# Patient Record
Sex: MALE | Race: ASIAN | NOT HISPANIC OR LATINO | Employment: OTHER | URBAN - METROPOLITAN AREA
[De-identification: names, ages, dates, MRNs, and addresses within clinical notes are randomized per-mention and may not be internally consistent; named-entity substitution may affect disease eponyms.]

---

## 2017-07-24 ENCOUNTER — TRANSCRIBE ORDERS (OUTPATIENT)
Dept: ADMINISTRATIVE | Facility: HOSPITAL | Age: 32
End: 2017-07-24

## 2017-07-24 DIAGNOSIS — R05.9 COUGH: ICD-10-CM

## 2017-07-24 DIAGNOSIS — K21.9 GASTROESOPHAGEAL REFLUX DISEASE, ESOPHAGITIS PRESENCE NOT SPECIFIED: Primary | ICD-10-CM

## 2017-07-24 DIAGNOSIS — J32.9 UNSPECIFIED SINUSITIS (CHRONIC): ICD-10-CM

## 2017-08-04 ENCOUNTER — HOSPITAL ENCOUNTER (OUTPATIENT)
Dept: PULMONOLOGY | Facility: HOSPITAL | Age: 32
Discharge: HOME | End: 2017-08-04
Payer: COMMERCIAL

## 2017-08-11 ENCOUNTER — HOSPITAL ENCOUNTER (OUTPATIENT)
Dept: PULMONOLOGY | Facility: HOSPITAL | Age: 32
Discharge: HOME | End: 2017-08-11
Payer: COMMERCIAL

## 2017-08-11 ENCOUNTER — GENERIC CONVERSION - ENCOUNTER (OUTPATIENT)
Dept: OTHER | Facility: OTHER | Age: 32
End: 2017-08-11

## 2017-08-11 DIAGNOSIS — K21.9 GASTROESOPHAGEAL REFLUX DISEASE, ESOPHAGITIS PRESENCE NOT SPECIFIED: ICD-10-CM

## 2017-08-11 DIAGNOSIS — R05.9 COUGH: ICD-10-CM

## 2017-08-11 DIAGNOSIS — J32.9 UNSPECIFIED SINUSITIS (CHRONIC): ICD-10-CM

## 2017-08-11 PROCEDURE — 94070 EVALUATION OF WHEEZING: CPT

## 2017-08-11 PROCEDURE — 94760 N-INVAS EAR/PLS OXIMETRY 1: CPT

## 2017-08-11 RX ORDER — ALBUTEROL SULFATE 2.5 MG/3ML
2.5 SOLUTION RESPIRATORY (INHALATION) ONCE AS NEEDED
Status: COMPLETED | OUTPATIENT
Start: 2017-08-11 | End: 2017-08-11

## 2017-08-11 RX ADMIN — ALBUTEROL SULFATE 2.5 MG: 2.5 SOLUTION RESPIRATORY (INHALATION) at 10:36

## 2017-08-11 RX ADMIN — METHACHOLINE CHLORIDE 5 BREATH: 100 POWDER, FOR SOLUTION RESPIRATORY (INHALATION) at 10:23

## 2018-06-05 RX ORDER — MONTELUKAST SODIUM 10 MG/1
TABLET ORAL
Refills: 5 | COMMUNITY
Start: 2018-04-11

## 2018-06-06 ENCOUNTER — OFFICE VISIT (OUTPATIENT)
Dept: INTERNAL MEDICINE CLINIC | Facility: CLINIC | Age: 33
End: 2018-06-06
Payer: COMMERCIAL

## 2018-06-06 VITALS
WEIGHT: 174 LBS | BODY MASS INDEX: 25.77 KG/M2 | DIASTOLIC BLOOD PRESSURE: 70 MMHG | HEART RATE: 70 BPM | RESPIRATION RATE: 14 BRPM | SYSTOLIC BLOOD PRESSURE: 100 MMHG | HEIGHT: 69 IN

## 2018-06-06 DIAGNOSIS — M79.89 MASS OF SOFT TISSUE OF RIGHT UPPER EXTREMITY: Primary | ICD-10-CM

## 2018-06-06 PROCEDURE — 1036F TOBACCO NON-USER: CPT | Performed by: INTERNAL MEDICINE

## 2018-06-06 PROCEDURE — 99203 OFFICE O/P NEW LOW 30 MIN: CPT | Performed by: INTERNAL MEDICINE

## 2018-06-06 NOTE — PATIENT INSTRUCTIONS
Problem List Items Addressed This Visit     Mass of soft tissue of right upper extremity - Primary      Most likely small lipoma, could also be small lymph node, does not have characteristics of a malignancy  Will get ultrasound for further characterization and consider surgical evaluation if needed           Relevant Orders    US extremity soft tissue

## 2018-06-06 NOTE — PROGRESS NOTES
Assessment/Plan:    Mass of soft tissue of right upper extremity   Most likely small lipoma, could also be small lymph node, does not have characteristics of a malignancy  Will get ultrasound for further characterization and consider surgical evaluation if needed  Diagnoses and all orders for this visit:    Mass of soft tissue of right upper extremity  -     US extremity soft tissue; Future          Subjective:      Patient ID: Lu Mcdaniel is a 27 y o  male  Lump in left upper arm for about 6 months, no significant change over that time, no significant pain, but is little sore when he presses on it sometimes  Chronic cough: pt on montelukast for this which helps some        The following portions of the patient's history were reviewed and updated as appropriate: allergies, current medications, past family history, past medical history, past social history, past surgical history and problem list     Review of Systems   Constitutional: Negative for chills, fatigue and fever  HENT: Negative for congestion, nosebleeds, postnasal drip, sore throat and trouble swallowing  Eyes: Negative for pain  Respiratory: Positive for cough  Negative for chest tightness, shortness of breath and wheezing  Cardiovascular: Negative for chest pain, palpitations and leg swelling  Gastrointestinal: Negative for abdominal pain, constipation, diarrhea, nausea and vomiting  Endocrine: Negative for polydipsia and polyuria  Genitourinary: Negative for dysuria, flank pain and hematuria  Musculoskeletal: Negative for arthralgias  Skin: Negative for rash  Neurological: Negative for dizziness, tremors and headaches  Hematological: Does not bruise/bleed easily  Psychiatric/Behavioral: Negative for confusion and dysphoric mood  The patient is not nervous/anxious            Objective:      /70   Pulse 70   Resp 14   Ht 5' 8 5" (1 74 m)   Wt 78 9 kg (174 lb)   BMI 26 07 kg/m²          Physical Exam Constitutional: He is oriented to person, place, and time  He appears well-developed and well-nourished  No distress  HENT:   Head: Normocephalic and atraumatic  Right Ear: External ear normal    Left Ear: External ear normal    Eyes: Conjunctivae are normal  No scleral icterus  Neck: Normal range of motion  Neck supple  No tracheal deviation present  No thyromegaly present  Cardiovascular: Normal rate, regular rhythm and normal heart sounds  Pulmonary/Chest: Effort normal and breath sounds normal  No respiratory distress  He has no wheezes  He has no rales  Abdominal: Soft  Bowel sounds are normal  There is no tenderness  There is no rebound and no guarding  Musculoskeletal: He exhibits no edema  Small oval soft tissue mass, not fixed, not nodular, not firm   Lymphadenopathy:     He has no cervical adenopathy  Neurological: He is alert and oriented to person, place, and time  Psychiatric: He has a normal mood and affect  His behavior is normal  Judgment and thought content normal    Vitals reviewed

## 2018-06-06 NOTE — ASSESSMENT & PLAN NOTE
Most likely small lipoma, could also be small lymph node, does not have characteristics of a malignancy  Will get ultrasound for further characterization and consider surgical evaluation if needed

## 2018-08-10 ENCOUNTER — OFFICE VISIT (OUTPATIENT)
Dept: INTERNAL MEDICINE CLINIC | Facility: CLINIC | Age: 33
End: 2018-08-10
Payer: COMMERCIAL

## 2018-08-10 VITALS
DIASTOLIC BLOOD PRESSURE: 80 MMHG | HEART RATE: 79 BPM | HEIGHT: 69 IN | TEMPERATURE: 98.2 F | OXYGEN SATURATION: 98 % | WEIGHT: 170.2 LBS | SYSTOLIC BLOOD PRESSURE: 110 MMHG | BODY MASS INDEX: 25.21 KG/M2

## 2018-08-10 DIAGNOSIS — R21 RASH: Primary | ICD-10-CM

## 2018-08-10 PROCEDURE — 99213 OFFICE O/P EST LOW 20 MIN: CPT | Performed by: INTERNAL MEDICINE

## 2018-08-10 PROCEDURE — 3008F BODY MASS INDEX DOCD: CPT | Performed by: INTERNAL MEDICINE

## 2018-08-10 RX ORDER — TRIAMCINOLONE ACETONIDE 5 MG/G
CREAM TOPICAL 3 TIMES DAILY
COMMUNITY
Start: 2017-10-25 | End: 2018-10-25

## 2018-08-10 RX ORDER — CETIRIZINE HYDROCHLORIDE 10 MG/1
10 TABLET ORAL
COMMUNITY
End: 2018-08-10 | Stop reason: SDUPTHER

## 2018-08-10 RX ORDER — MOMETASONE FUROATE 50 UG/1
2 SPRAY, METERED NASAL
COMMUNITY

## 2018-08-10 RX ORDER — NYSTATIN 100000 U/G
CREAM TOPICAL 2 TIMES DAILY
Qty: 30 G | Refills: 0 | Status: SHIPPED | OUTPATIENT
Start: 2018-08-10

## 2018-08-10 RX ORDER — METHYLPREDNISOLONE 4 MG/1
TABLET ORAL
Qty: 21 TABLET | Refills: 0 | Status: SHIPPED | OUTPATIENT
Start: 2018-08-10 | End: 2020-08-25 | Stop reason: ALTCHOICE

## 2018-08-10 NOTE — PROGRESS NOTES
Assessment/Plan:    Rash   Patient's rash on neck and back of leg have the classic appearance of poison ivy, this should improve with time and topical treatment with calamine lotion, patient instructed that if erythema worsening, we could try an antibiotic in case an early cellulitis is developing  Rash on forehead could be an irritant dermatitis from combination of sun and sweat and wiping, it does not have the appearance of poison ivy  The initial rash from a couple weeks ago is more difficult,  This does not have the classic distribution or appearance of scabies  Pt is being referred to Derm,  also consider allergy testing       Diagnoses and all orders for this visit:    Rash  -     Ambulatory referral to Dermatology; Future  -     nystatin (MYCOSTATIN) cream; Apply topically 2 (two) times a day  -     Methylprednisolone 4 MG TBPK; Use as directed on package    Other orders  -     Discontinue: cetirizine (ZyrTEC) 10 mg tablet; Take 10 mg by mouth  -     mometasone (NASONEX) 50 mcg/act nasal spray; 2 sprays into each nostril  -     triamcinolone (KENALOG) 0 5 % cream; Apply topically Three times a day          Subjective:      Patient ID: Talib Louis is a 27 y o  male  Onset of rash 2-3 weeks ago had itchy rash located on abdomen and arm  Since it looked similar to what he had a year ago, he used permethrin for when he had possible scabies a year ago  The topical treatment seemed to help a little  Then patient develops another rash in his right neck area and behind his right knee which has been very itchy, and has more classic appearance of contact dermatitis from poison ivy with vesicles and oozing  He also developed a rash in his forehead after being out in the sun  Rash is located both sides of far head, no vesicles or crusting, no pain  He was sweating and wiping area a lot          The following portions of the patient's history were reviewed and updated as appropriate: allergies, current medications, past family history, past medical history, past social history, past surgical history and problem list     Review of Systems      Objective:      /80   Pulse 79   Temp 98 2 °F (36 8 °C)   Ht 5' 9" (1 753 m)   Wt 77 2 kg (170 lb 3 2 oz)   SpO2 98%   BMI 25 13 kg/m²          Physical Exam   Constitutional: He appears well-developed and well-nourished  HENT:   No tongue or lip swelling   Pulmonary/Chest: No stridor  He has no wheezes  Skin:   Patient has macular her vesicular patch on right neck with some linear distribution, erythema and losing behind the right knee, and some scattered papular lesions on arm leg    On trunk in some folds has some erythema with satellite lesions

## 2018-08-10 NOTE — PATIENT INSTRUCTIONS
Problem List Items Addressed This Visit     Rash - Primary      Patient's rash on neck and back of leg have the classic appearance of poison ivy, this should improve with time and topical treatment with calamine lotion, patient instructed that if erythema worsening, we could try an antibiotic in case an early cellulitis is developing  Rash on forehead could be an irritant dermatitis from combination of sun and sweat and wiping, it does not have the appearance of poison ivy  The initial rash from a couple weeks ago is more difficult,  This does not have the classic distribution or appearance of scabies   Pt is being referred to Derm,  also consider allergy testing         Relevant Medications    triamcinolone (KENALOG) 0 5 % cream    nystatin (MYCOSTATIN) cream    Methylprednisolone 4 MG TBPK    Other Relevant Orders    Ambulatory referral to Dermatology

## 2018-08-10 NOTE — ASSESSMENT & PLAN NOTE
Patient's rash on neck and back of leg have the classic appearance of poison ivy, this should improve with time and topical treatment with calamine lotion, patient instructed that if erythema worsening, we could try an antibiotic in case an early cellulitis is developing  Rash on forehead could be an irritant dermatitis from combination of sun and sweat and wiping, it does not have the appearance of poison ivy  The initial rash from a couple weeks ago is more difficult,  This does not have the classic distribution or appearance of scabies   Pt is being referred to [facility],  also consider allergy testing

## 2021-01-19 ENCOUNTER — TELEPHONE (OUTPATIENT)
Dept: INTERNAL MEDICINE CLINIC | Facility: CLINIC | Age: 36
End: 2021-01-19

## 2021-01-19 NOTE — TELEPHONE ENCOUNTER
Spoke with pt's wife to verify if we are still PCP  Wife stated we are not PCP and Mu is being seen by a different provider

## 2021-01-27 NOTE — TELEPHONE ENCOUNTER
01/26/21 8:57 PM     Thank you for your request  Your request has been received, reviewed, and the patient chart updated  The PCP has successfully been removed with a patient attribution note  This message will now be completed      Thank you  Endy Sharif

## 2021-04-13 DIAGNOSIS — Z23 ENCOUNTER FOR IMMUNIZATION: ICD-10-CM

## 2022-04-28 ENCOUNTER — OFFICE VISIT (OUTPATIENT)
Dept: FAMILY MEDICINE CLINIC | Facility: CLINIC | Age: 37
End: 2022-04-28
Payer: COMMERCIAL

## 2022-04-28 VITALS
HEIGHT: 70 IN | DIASTOLIC BLOOD PRESSURE: 78 MMHG | SYSTOLIC BLOOD PRESSURE: 112 MMHG | HEART RATE: 68 BPM | OXYGEN SATURATION: 99 % | BODY MASS INDEX: 25.2 KG/M2 | WEIGHT: 176 LBS | TEMPERATURE: 97.1 F

## 2022-04-28 DIAGNOSIS — E50.9 VITAMIN A DEFICIENCY: ICD-10-CM

## 2022-04-28 DIAGNOSIS — E54 ASCORBIC ACID DEFICIENCY: ICD-10-CM

## 2022-04-28 DIAGNOSIS — Z13.6 SCREENING FOR CARDIOVASCULAR CONDITION: ICD-10-CM

## 2022-04-28 DIAGNOSIS — E55.9 VITAMIN D DEFICIENCY: ICD-10-CM

## 2022-04-28 DIAGNOSIS — T78.40XA ALLERGY, INITIAL ENCOUNTER: Primary | ICD-10-CM

## 2022-04-28 DIAGNOSIS — Z28.39 IMMUNIZATION DEFICIENCY: ICD-10-CM

## 2022-04-28 DIAGNOSIS — E51.9 THIAMINE DEFICIENCY: ICD-10-CM

## 2022-04-28 DIAGNOSIS — R05.3 CHRONIC COUGH: ICD-10-CM

## 2022-04-28 DIAGNOSIS — Z23 NEED FOR TDAP VACCINATION: ICD-10-CM

## 2022-04-28 DIAGNOSIS — R21 SKIN RASH: ICD-10-CM

## 2022-04-28 DIAGNOSIS — E53.1 PYRIDOXINE DEFICIENCY: ICD-10-CM

## 2022-04-28 DIAGNOSIS — E53.8 CYANOCOBALAMIN DEFICIENCY: ICD-10-CM

## 2022-04-28 PROCEDURE — 90715 TDAP VACCINE 7 YRS/> IM: CPT | Performed by: FAMILY MEDICINE

## 2022-04-28 PROCEDURE — 90471 IMMUNIZATION ADMIN: CPT | Performed by: FAMILY MEDICINE

## 2022-04-28 PROCEDURE — 99204 OFFICE O/P NEW MOD 45 MIN: CPT | Performed by: FAMILY MEDICINE

## 2022-04-28 RX ORDER — CETIRIZINE HYDROCHLORIDE 10 MG/1
10 TABLET ORAL DAILY
COMMUNITY

## 2022-04-28 NOTE — PROGRESS NOTES
BMI Counseling: Body mass index is 25 43 kg/m²  The BMI is above normal  Nutrition recommendations include decreasing portion sizes, encouraging healthy choices of fruits and vegetables, limiting drinks that contain sugar and reducing intake of cholesterol  Exercise recommendations include exercising 3-5 times per week  No pharmacotherapy was ordered  Rationale for BMI follow-up plan is due to patient being overweight or obese  Depression Screening and Follow-up Plan: Patient was screened for depression during today's encounter  They screened negative with a PHQ-2 score of 0  Assessment/Plan:  On exam he has inflamed uvula suspect if all workup was negative could mean vocal cord dysfunction  Refer to ENT for further evaluation or follow-up since he has seen 1 in a long time  Is vocal cord dysfunction he needs physical therapy speech therapy  Will need blood work to check for sed rate inflammation marker and sputum culture  Tdap vaccine given today  Will if all workup normal recommendation would be for acupuncture to decrease inflammation and lymphatic massage drainage  Will set him up for future visit for complete physical   Advised for COVID booster vaccine  Will check for immunization titers make sure he is vaccinated  I have spent 40 minutes with Patient  today in which greater than 50% of this time was spent in counseling/coordination of care regarding Prognosis, Risks and benefits of tx options and Intructions for management             Problem List Items Addressed This Visit        Other    Chronic cough    Relevant Orders    Ambulatory Referral to Otolaryngology    CBC and differential    Comprehensive metabolic panel    Antinuclear Antibodies, IFA    TSH, 3rd generation with Free T4 reflex    UA w Reflex to Microscopic w Reflex to Culture    Sedimentation rate, automated    C-reactive protein    Quantiferon TB Gold Plus    Sputum culture and Gram stain    Allergies - Primary      Other Visit Diagnoses     Skin rash        Relevant Orders    Vitamin A    Need for Tdap vaccination        Relevant Orders    TDAP VACCINE GREATER THAN OR EQUAL TO 8YO IM (Completed)    Cyanocobalamin deficiency        Relevant Orders    Vitamin B12    Immunization deficiency        Relevant Orders    Hepatitis A antibody, total    Hepatitis B surface antibody    Measles/Mumps/Rubella Immunity    Vitamin D deficiency        Relevant Orders    Vitamin D 25 hydroxy    Screening for cardiovascular condition        Relevant Orders    Lipid Panel with Direct LDL reflex    Thiamine deficiency        Relevant Orders    Vitamin B1 (Thiamine), Serum/Plasma, LC/MS/MS    Vitamin A deficiency        Relevant Orders    Vitamin A    Pyridoxine deficiency        Relevant Orders    Vitamin B6    Ascorbic acid deficiency        Relevant Orders    Vitamin C    BMI 25 0-25 9,adult                Subjective:      Patient ID: Amari Merrill is a 43 y o  male  66-year-old male for establish care  For 20 years he struggle from chronic cough  Saw multiple specialists ENT was thought that he had reflux was placed on omeprazole did not help  He saw gastroenterologist had EGD did not show any reflux see went off omeprazole  He saw allergist had multiple testing done shows some environmental allergies but no work no diagnosis for chronic cough  He was treated as if he have asthma  If the allergens around does trigger the cough with postnasal drip tickling in his throat and that will be full-blown coughing  He works from home he does not smoke does not drink alcohol  He does had a 3year-old baby girl  No family history of cough or asthma  He is not taking any medication  No weight loss  He does have very sensitive skin to rash when exposed to allergens  No joint pain no put trouble urination or bowel movement  No family history of cancer        The following portions of the patient's history were reviewed and updated as appropriate:   Past Medical History:  He has a past medical history of Allergic and Headache(784 0)  ,  _______________________________________________________________________  Medical Problems:  does not have any pertinent problems on file ,  _______________________________________________________________________  Past Surgical History:   has a past surgical history that includes No past surgeries  ,  _______________________________________________________________________  Family History:  family history includes No Known Problems in his father and mother ,  _______________________________________________________________________  Social History:   reports that he has never smoked  He has never used smokeless tobacco  He reports current alcohol use of about 2 0 standard drinks of alcohol per week  He reports that he does not use drugs  ,  _______________________________________________________________________  Allergies:  is allergic to penicillins     _______________________________________________________________________  Current Outpatient Medications   Medication Sig Dispense Refill    cetirizine (ZyrTEC) 10 mg tablet Take 10 mg by mouth daily      mometasone (NASONEX) 50 mcg/act nasal spray 2 sprays into each nostril      montelukast (SINGULAIR) 10 mg tablet TAKE 1 TABLET BY MOUTH EACH EVENING  5    omeprazole (PriLOSEC) 20 mg delayed release capsule Take 1 capsule (20 mg total) by mouth daily 30 capsule 11    predniSONE 20 mg tablet Take 1 tablet (20 mg total) by mouth daily 3 tablets each morning x 5 days, 2 tablets each morning x 3 days, 1 tablet each morning x 2 days   23 tablet 0    nystatin (MYCOSTATIN) cream Apply topically 2 (two) times a day (Patient not taking: Reported on 8/25/2020) 30 g 0    triamcinolone (KENALOG) 0 5 % cream Apply topically Three times a day       No current facility-administered medications for this visit      _______________________________________________________________________  Review of Systems   Constitutional: Negative for activity change, appetite change, fatigue, fever and unexpected weight change  HENT: Positive for postnasal drip and rhinorrhea  Negative for dental problem and trouble swallowing  Eyes: Negative for photophobia and visual disturbance  Respiratory: Positive for cough  Negative for chest tightness  Cardiovascular: Negative for chest pain, palpitations and leg swelling  Gastrointestinal: Negative for abdominal pain, constipation and vomiting  Endocrine: Negative for cold intolerance, polydipsia and polyuria  Genitourinary: Negative for difficulty urinating, frequency and urgency  Musculoskeletal: Negative for arthralgias, joint swelling, myalgias and neck pain  Skin: Positive for rash  Negative for color change and wound  Allergic/Immunologic: Negative for environmental allergies  Neurological: Negative for dizziness, weakness and numbness  Hematological: Does not bruise/bleed easily  Psychiatric/Behavioral: Negative for decreased concentration, dysphoric mood, self-injury, sleep disturbance and suicidal ideas  Objective:  Vitals:    04/28/22 1406   BP: 112/78   BP Location: Left arm   Patient Position: Sitting   Cuff Size: Standard   Pulse: 68   Temp: (!) 97 1 °F (36 2 °C)   TempSrc: Tympanic   SpO2: 99%   Weight: 79 8 kg (176 lb)   Height: 5' 9 75" (1 772 m)     Body mass index is 25 43 kg/m²  Physical Exam  Vitals and nursing note reviewed  Constitutional:       Appearance: Normal appearance  He is well-developed  HENT:      Head: Normocephalic and atraumatic  Right Ear: Tympanic membrane, ear canal and external ear normal       Left Ear: Tympanic membrane, ear canal and external ear normal       Nose: Nose normal       Mouth/Throat:      Mouth: Mucous membranes are dry  Pharynx: Oropharynx is clear  Comments: Uvula erythema, mild inflammed  Eyes:      Extraocular Movements: Extraocular movements intact        Pupils: Pupils are equal, round, and reactive to light  Cardiovascular:      Rate and Rhythm: Normal rate and regular rhythm  Heart sounds: Normal heart sounds  Pulmonary:      Effort: Pulmonary effort is normal       Breath sounds: Normal breath sounds  Abdominal:      General: Bowel sounds are normal       Palpations: Abdomen is soft  Musculoskeletal:         General: Normal range of motion  Cervical back: Normal range of motion and neck supple  Skin:     General: Skin is warm and dry  Capillary Refill: Capillary refill takes less than 2 seconds  Neurological:      General: No focal deficit present  Mental Status: He is alert and oriented to person, place, and time  Mental status is at baseline  Psychiatric:         Mood and Affect: Mood normal          Behavior: Behavior normal          Thought Content:  Thought content normal          Judgment: Judgment normal

## 2022-10-05 ENCOUNTER — TELEPHONE (OUTPATIENT)
Dept: SPEECH THERAPY | Facility: CLINIC | Age: 37
End: 2022-10-05

## 2022-10-13 NOTE — PROGRESS NOTES
Speech-Language Pathology Initial Evaluation    Today's date: 10/13/2022  Patient’s name: Aileen Espinoza  : 1985  MRN: [id number]  Safety measures: n/a  Referring provider: Marcial Jones MD    Primary diagnosis/billing code: T23 5  Secondary diagnosis/billing code: R05 9    Visit tracking:  -Referring provider: Epic  -Billing guidelines: AMA  -Visit #  -Insurance: CBC  -RE due 2022    Subjective comments: Patient presented to today's appointment with c/o an "on-and-off" cough for approximately the past 20 years  Patient stated that he was dx with chronic cough and recommended to f/u with OP ST services by his ENT  Patient questioned if he has learned maladaptive behaviors leading to his increased symptoms  Patient reported that "unpure" air, cooking smells, smoke (e g , campfire), pollution (e g , car exhaust fumes), steam in shower, fragrances (e g , potpourri), poor posture, temperature change (e g , walking outside into the cold), exercising can lead to coughing episodes  Patient stated that cough is variable  Improvement noted with symptoms when he was in the "fresh air" on the [employer] after 1 or so weeks  Patient has since returned home and his coughing symptoms are less than before, but they are slowly returning to baseline level  Patient also stated that he has a h/o bruxism and a deviated septum  Patient reported that he f/u with GI (reflux r/o), pulmonology (lung fx test [facility], negative for asthma), allergist (no correlation with chronic cough/allergies)  How did the patient hear about us? Physician    Patient's goal(s): "to get better"    Reason for referral: chronic cough  Prior functional status: n/a  Clinically complex situations: n/a    History: Patient is a 43 y o  male who was referred to outpatient skilled Speech Therapy services for a voice/chronic cough evaluation  Patient was seen by ENT on 2022   Per chart review, patient has had a cough for most of his adult life  Triggers include any sort of odors or pollutants in the air  Patient is also experiencing allergy symptoms, including PND and nasal congestion  Cough comes from a "tickle" in his throat  More recently, patient experiences coughing fits when he goes into the shower, when he exercises, or if he sits with poor posture  Deep breathing exercises help to stop his coughing  Patient has f/u with allergy and asthma specialists in the past  Patient also had a GI workup, including endoscopy, pH monitoring, and trial of PPI (did not help)--no reported signs of reflux  Also, to note, when he is in the fresh air, like in the [facility], he does not cough  ENT diagnosed patient with chronic cough, psychogenic vs  neurogenic  Patient was referred to OP ST for cough suppression training  Patient was also recommended a trial of prednisone (did not take)  Hearing: [facility] for testing  Vision: [facility] for testing    Home environment/lifestyle: Lives with wife and child  Highest level of education: Undergraduate degree  Vocational status: Self-employed    Mental status: Alert  Behavior status: Cooperative  Communication modalities: Verbal  Rehabilitation prognosis: Good rehab potential to reach the established goals    Assessments    VOICE EVALUATION:    Interview:   -Chief complaint: chronic cough  -Onset: "on-and-off with varying symptoms" (beginnin years ago, bronchitis)    -Nature of cough: chronic/persistent/dry/variable  -Course: improvement noted when in "fresh air" on the [employer] after 1 or so weeks   home now (symptoms are less than before, but slowly returning to baseline level)  -Frequency of coughing fits: variable   -Duration of coughing fits: variable  -Triggers (e g , specific event, setting, or severity of stress): "unpure" air, cooking smells, smoke (e g , campfire), pollution (e g , car exhaust fumes), steam in shower, fragrances (e g , potpourri), poor posture, temperature change (e g , walking outside into the cold), exercising   -Symptoms increase when lying down: almost never  -Severity and/or duration of an episode caused urinary incontinence or vomiting: not reported  -Symptoms improve with bronchodilators/asthma medications: not reported  -Using strategies to try to suppress the cough or break the cycle of coughing: breathing exercises    -Water intake: at least five 16 9 fl oz water bottles/day  -Caffeine intake: 1 cup of coffee/day  -Alcohol intake: almost none (at most, 1 beer/week)  -Smoking history: no  -Laryngeal demand (work, home, socially): infrequent  -Throat clearing: more elevated as compared to the past  -Coughing: yes  -Lozenges (mentholated?): sometimes (yes)  -Exposure to environmental triggers (e g , temperature changes, smoke, chemicals, allergens): no  -History: Allergies, Sinusitis, Post nasal drip, Cough and TMJ   -Dyspnea: no-mild  -Dysphagia: no  -Allergy testing: yes  -Nasal symptoms: deviated septum, PND  -GERD/LPR symptoms: no  -Recurrent URI: no  -Previous voice therapy: no      Patient Self-Ratings:  -The Cough Severity Index (CSI) is a self-administered, 10-item outcomes instrument to quantify a patient's symptoms of chronic cough of upper airway origin  The total score ranges from a 0 to 40 based on the sum of responses to 10 questions on a 5-point scale ranging from “never a problem” to “always a problem ” A score of 3 or below is considered normal  A score higher than 3 means that the cough may be impacting quality of life  Patient obtained a score of 11/40  (see scanned document)    Goals    Short-term goals:  1  Patient will decrease the frequency and phonotrauma/forcefulness of cough by utilizing cough interruption and laryngeal control techniques in 4 out of 5 opportunities (to be achieved in 2-4 weeks)      2  Patient will be educated on vocal hygiene and demonstrate understanding of recommendations to facilitate increased vocal health (to be achieved in 2-4 weeks)  Long-term goals:  1  Patient will reduce or eliminate chronic cough (to be achieved by discharge)  2  Patient will demonstrate comprehension of independent HEP (to be achieved by discharge)  Impressions/Recommendations    Impressions:   -Patient was seen today for a voice evaluation for chronic cough  Patient's symptoms were consistent with those of the referring physician's diagnosis  Clinician provided patient with education and initiated training exercises during today's session  Patient was stimulable and it is suspected that he can demonstrate improvement with therapeutic strategies  Rehabilitative prognosis for improvement is strong based upon patient motivation      Recommendations:  -Patient would benefit from outpatient skilled Speech Therapy services: Voice therapy/cough suppression techniques/breathing techniques    -Frequency: 2-4 sessions  -Duration: 6-8 weeks    -Intervention certification from: 75/95/7081  -Intervention certification to: 55/66/8475

## 2022-10-17 ENCOUNTER — EVALUATION (OUTPATIENT)
Dept: SPEECH THERAPY | Facility: CLINIC | Age: 37
End: 2022-10-17
Payer: COMMERCIAL

## 2022-10-17 DIAGNOSIS — R05.9 COUGH: ICD-10-CM

## 2022-10-17 DIAGNOSIS — R05.3 CHRONIC COUGH: Primary | ICD-10-CM

## 2022-10-17 PROCEDURE — 92524 BEHAVRAL QUALIT ANALYS VOICE: CPT | Performed by: SPEECH-LANGUAGE PATHOLOGIST

## 2022-10-31 NOTE — PROGRESS NOTES
Speech-Language Pathology Discharge    Today's date: 2022  Patient’s name: Amari Merrill  : 1985  MRN: [id number]  Safety measures: n/a  Referring provider: Cailin Kwok MD    Primary diagnosis/billing code: L79 [id number]  Secondary diagnosis/billing code: R05 [id number]    Visit tracking:  -Referring provider: Epic  -Billing guidelines: AMA  -Visit #  -Insurance: CBC  -RE due 2022    Subjective comments: Patient reported that cough suppression techniques/breathing techniques were "very effective"  Patient's goal(s): "to get better"    Assessments    No formal testing was completed on this date of service  The following serves as a diagnostic treatment session and progress update: Today's therapy session:  -Patient reported that cough suppression techniques/breathing techniques were "very effective" since he was last seen in outpatient skilled Speech Therapy services on 10/17/2022  Patient reported that he has found most benefit in implementing the hard swallow, as well as pursed lip breathing and/or exhaling on "sss"  Patient also reported that it has been helpful having an improved overall awareness into making a conscious effort to not cough--increased comprehension of the positive feedback loop associaed with chronic cough  Patient stated that the "tickle" in his throat is still present; however, it is 80-90% better  Patient suspects that PND may be contributing to this  Patient also reported that the "volume" of his coughing is 90-95% better  Patient Self-Ratings:  -The Cough Severity Index (CSI) is a self-administered, 10-item outcomes instrument to quantify a patient's symptoms of chronic cough of upper airway origin   The total score ranges from a 0 to 40 based on the sum of responses to 10 questions on a 5-point scale ranging from “never a problem” to “always a problem ” A score of 3 or below is considered normal  A score higher than 3 means that the cough may be impacting quality of life  Patient obtained a score of 1/40 (see scanned document) -- IMPROVEMENT (initial evaluation: 11/40)    Goals    Short-term goals:  1  Patient will decrease the frequency and phonotrauma/forcefulness of cough by utilizing cough interruption and laryngeal control techniques in 4 out of 5 opportunities (to be achieved in 2-4 weeks)  -- MET    2  Patient will be educated on vocal hygiene and demonstrate understanding of recommendations to facilitate increased vocal health (to be achieved in 2-4 weeks)  -- MET    Long-term goals:  1  Patient will reduce or eliminate chronic cough (to be achieved by discharge)  -- MET    2  Patient will demonstrate comprehension of independent HEP (to be achieved by discharge)  -- MET    Impressions/Recommendations    Impressions:   -Patient was evaluated on 10/17/2022 for chronic cough  On that date of service, patient received training on cough suppression techniques/breathing techniques  In today's first follow-up session since the evaluation, patient has gained an increased amount of control over supressing his cough  Patient demonstrates an increased awareness of the positive feedback loop associated with chronic cough and is implementing techniques to "break the cycle"  Patient is ready for discharge to an independent HEP on this date of service  Recommendations:  -Patient to be discharged from outpatient skilled Speech Therapy services: Patient achieved all goals in plan of care   Patient to be discharged to an independent Home Exercise Program     -Frequency: No treatment is warranted at this time   -Duration: N/A

## 2022-11-07 ENCOUNTER — OFFICE VISIT (OUTPATIENT)
Dept: SPEECH THERAPY | Facility: CLINIC | Age: 37
End: 2022-11-07

## 2022-11-07 DIAGNOSIS — R05.3 CHRONIC COUGH: Primary | ICD-10-CM

## 2022-11-22 ENCOUNTER — RA CDI HCC (OUTPATIENT)
Dept: OTHER | Facility: HOSPITAL | Age: 37
End: 2022-11-22